# Patient Record
Sex: MALE | Race: OTHER | Employment: FULL TIME | URBAN - METROPOLITAN AREA
[De-identification: names, ages, dates, MRNs, and addresses within clinical notes are randomized per-mention and may not be internally consistent; named-entity substitution may affect disease eponyms.]

---

## 2024-01-28 ENCOUNTER — HOSPITAL ENCOUNTER (EMERGENCY)
Facility: HOSPITAL | Age: 39
Discharge: HOME | End: 2024-01-28
Attending: EMERGENCY MEDICINE
Payer: COMMERCIAL

## 2024-01-28 ENCOUNTER — APPOINTMENT (EMERGENCY)
Dept: CT IMAGING | Facility: HOSPITAL | Age: 39
End: 2024-01-28
Payer: COMMERCIAL

## 2024-01-28 VITALS
DIASTOLIC BLOOD PRESSURE: 84 MMHG | WEIGHT: 168 LBS | RESPIRATION RATE: 20 BRPM | OXYGEN SATURATION: 100 % | TEMPERATURE: 97.9 F | HEART RATE: 74 BPM | SYSTOLIC BLOOD PRESSURE: 122 MMHG

## 2024-01-28 DIAGNOSIS — N39.0 UTI (URINARY TRACT INFECTION): ICD-10-CM

## 2024-01-28 DIAGNOSIS — K55.20 ANGIODYSPLASIA OF COLON: Primary | ICD-10-CM

## 2024-01-28 LAB
ALBUMIN SERPL BCP-MCNC: 4.5 G/DL (ref 3.5–5)
ALP SERPL-CCNC: 59 U/L (ref 34–104)
ALT SERPL W P-5'-P-CCNC: 15 U/L (ref 7–52)
ANION GAP SERPL CALCULATED.3IONS-SCNC: 6 MMOL/L
AST SERPL W P-5'-P-CCNC: 18 U/L (ref 13–39)
BACTERIA UR QL AUTO: ABNORMAL /HPF
BASOPHILS # BLD AUTO: 0.03 THOUSANDS/ÂΜL (ref 0–0.1)
BASOPHILS NFR BLD AUTO: 0 % (ref 0–1)
BILIRUB SERPL-MCNC: 1.37 MG/DL (ref 0.2–1)
BILIRUB UR QL STRIP: NEGATIVE
BUN SERPL-MCNC: 12 MG/DL (ref 5–25)
CALCIUM SERPL-MCNC: 9.7 MG/DL (ref 8.4–10.2)
CHLORIDE SERPL-SCNC: 107 MMOL/L (ref 96–108)
CLARITY UR: ABNORMAL
CO2 SERPL-SCNC: 28 MMOL/L (ref 21–32)
COLOR UR: ABNORMAL
CREAT SERPL-MCNC: 1.06 MG/DL (ref 0.6–1.3)
EOSINOPHIL # BLD AUTO: 0.1 THOUSAND/ÂΜL (ref 0–0.61)
EOSINOPHIL NFR BLD AUTO: 1 % (ref 0–6)
ERYTHROCYTE [DISTWIDTH] IN BLOOD BY AUTOMATED COUNT: 12.7 % (ref 11.6–15.1)
GFR SERPL CREATININE-BSD FRML MDRD: 87 ML/MIN/1.73SQ M
GLUCOSE SERPL-MCNC: 94 MG/DL (ref 65–140)
GLUCOSE UR STRIP-MCNC: NEGATIVE MG/DL
HCT VFR BLD AUTO: 45 % (ref 36.5–49.3)
HGB BLD-MCNC: 14.7 G/DL (ref 12–17)
HGB UR QL STRIP.AUTO: NEGATIVE
IMM GRANULOCYTES # BLD AUTO: 0.01 THOUSAND/UL (ref 0–0.2)
IMM GRANULOCYTES NFR BLD AUTO: 0 % (ref 0–2)
KETONES UR STRIP-MCNC: NEGATIVE MG/DL
LEUKOCYTE ESTERASE UR QL STRIP: ABNORMAL
LIPASE SERPL-CCNC: 9 U/L (ref 11–82)
LYMPHOCYTES # BLD AUTO: 1.75 THOUSANDS/ÂΜL (ref 0.6–4.47)
LYMPHOCYTES NFR BLD AUTO: 25 % (ref 14–44)
MCH RBC QN AUTO: 29.1 PG (ref 26.8–34.3)
MCHC RBC AUTO-ENTMCNC: 32.7 G/DL (ref 31.4–37.4)
MCV RBC AUTO: 89 FL (ref 82–98)
MONOCYTES # BLD AUTO: 0.62 THOUSAND/ÂΜL (ref 0.17–1.22)
MONOCYTES NFR BLD AUTO: 9 % (ref 4–12)
MUCOUS THREADS UR QL AUTO: ABNORMAL
NEUTROPHILS # BLD AUTO: 4.53 THOUSANDS/ÂΜL (ref 1.85–7.62)
NEUTS SEG NFR BLD AUTO: 65 % (ref 43–75)
NITRITE UR QL STRIP: NEGATIVE
NON-SQ EPI CELLS URNS QL MICRO: ABNORMAL /HPF
NRBC BLD AUTO-RTO: 0 /100 WBCS
PH UR STRIP.AUTO: 5.5 [PH]
PLATELET # BLD AUTO: 257 THOUSANDS/UL (ref 149–390)
PMV BLD AUTO: 10.3 FL (ref 8.9–12.7)
POTASSIUM SERPL-SCNC: 3.8 MMOL/L (ref 3.5–5.3)
PROT SERPL-MCNC: 7.6 G/DL (ref 6.4–8.4)
PROT UR STRIP-MCNC: ABNORMAL MG/DL
RBC # BLD AUTO: 5.05 MILLION/UL (ref 3.88–5.62)
RBC #/AREA URNS AUTO: ABNORMAL /HPF
SODIUM SERPL-SCNC: 141 MMOL/L (ref 135–147)
SP GR UR STRIP.AUTO: 1.01 (ref 1–1.03)
UROBILINOGEN UR STRIP-ACNC: <2 MG/DL
WBC # BLD AUTO: 7.04 THOUSAND/UL (ref 4.31–10.16)
WBC #/AREA URNS AUTO: ABNORMAL /HPF

## 2024-01-28 PROCEDURE — 99284 EMERGENCY DEPT VISIT MOD MDM: CPT

## 2024-01-28 PROCEDURE — 87077 CULTURE AEROBIC IDENTIFY: CPT | Performed by: EMERGENCY MEDICINE

## 2024-01-28 PROCEDURE — G1004 CDSM NDSC: HCPCS

## 2024-01-28 PROCEDURE — 83690 ASSAY OF LIPASE: CPT | Performed by: EMERGENCY MEDICINE

## 2024-01-28 PROCEDURE — 96365 THER/PROPH/DIAG IV INF INIT: CPT

## 2024-01-28 PROCEDURE — 99285 EMERGENCY DEPT VISIT HI MDM: CPT | Performed by: EMERGENCY MEDICINE

## 2024-01-28 PROCEDURE — 36415 COLL VENOUS BLD VENIPUNCTURE: CPT

## 2024-01-28 PROCEDURE — 87086 URINE CULTURE/COLONY COUNT: CPT | Performed by: EMERGENCY MEDICINE

## 2024-01-28 PROCEDURE — 85025 COMPLETE CBC W/AUTO DIFF WBC: CPT | Performed by: EMERGENCY MEDICINE

## 2024-01-28 PROCEDURE — 81001 URINALYSIS AUTO W/SCOPE: CPT | Performed by: EMERGENCY MEDICINE

## 2024-01-28 PROCEDURE — 87186 SC STD MICRODIL/AGAR DIL: CPT | Performed by: EMERGENCY MEDICINE

## 2024-01-28 PROCEDURE — 80053 COMPREHEN METABOLIC PANEL: CPT | Performed by: EMERGENCY MEDICINE

## 2024-01-28 PROCEDURE — 74177 CT ABD & PELVIS W/CONTRAST: CPT

## 2024-01-28 RX ORDER — CEFPODOXIME PROXETIL 200 MG/1
200 TABLET, FILM COATED ORAL 2 TIMES DAILY
Qty: 20 TABLET | Refills: 0 | Status: SHIPPED | OUTPATIENT
Start: 2024-01-28 | End: 2024-02-07

## 2024-01-28 RX ADMIN — CEFTRIAXONE SODIUM 1000 MG: 10 INJECTION, POWDER, FOR SOLUTION INTRAVENOUS at 14:17

## 2024-01-28 RX ADMIN — IOHEXOL 85 ML: 350 INJECTION, SOLUTION INTRAVENOUS at 15:15

## 2024-01-29 NOTE — ED PROVIDER NOTES
History  Chief Complaint   Patient presents with    Abdominal Pain     Patient here for eval of worsening abd pain in the last 2 weeks. Hx. Ecoli that provider reports has spread to his bladder. +Cloudy urine +Malodorous urine . Denies Fevers/hematuria. Last BM yesterday, normal. Denies N/V/D.        Abdominal Pain  Associated symptoms: dysuria    Associated symptoms: no chest pain, no chills, no cough, no diarrhea, no fever, no nausea, no shortness of breath, no sore throat and no vomiting      38-year-old presented to the ER with dysuria.  History of E. coli UTI in the past.  Has some suprapubic pain.  No fevers or chills.  No nausea vomiting.  No diarrhea.  No back pain.  No IV drug use.  No testicular pain.        None       History reviewed. No pertinent past medical history.    History reviewed. No pertinent surgical history.    History reviewed. No pertinent family history.  I have reviewed and agree with the history as documented.    E-Cigarette/Vaping     E-Cigarette/Vaping Substances     Social History     Tobacco Use    Smoking status: Never    Smokeless tobacco: Never       Review of Systems   Constitutional:  Negative for chills, diaphoresis and fever.   HENT:  Negative for congestion and sore throat.    Respiratory:  Negative for cough, shortness of breath, wheezing and stridor.    Cardiovascular:  Negative for chest pain, palpitations and leg swelling.   Gastrointestinal:  Positive for abdominal pain. Negative for blood in stool, diarrhea, nausea and vomiting.   Genitourinary:  Positive for dysuria. Negative for frequency and urgency.   Musculoskeletal:  Negative for neck pain and neck stiffness.   Skin:  Negative for pallor and rash.   Neurological:  Negative for dizziness, syncope, weakness, light-headedness and headaches.   All other systems reviewed and are negative.      Physical Exam  Physical Exam  Vitals reviewed.   Constitutional:       Appearance: He is well-developed.   HENT:      Head:  Normocephalic and atraumatic.   Eyes:      Pupils: Pupils are equal, round, and reactive to light.   Cardiovascular:      Rate and Rhythm: Normal rate and regular rhythm.      Heart sounds: Normal heart sounds.   Pulmonary:      Effort: Pulmonary effort is normal. No respiratory distress.      Breath sounds: Normal breath sounds.   Abdominal:      General: Bowel sounds are normal.      Palpations: Abdomen is soft.      Tenderness: There is abdominal tenderness in the suprapubic area.   Musculoskeletal:      Cervical back: Neck supple.   Skin:     General: Skin is warm and dry.      Capillary Refill: Capillary refill takes less than 2 seconds.   Neurological:      General: No focal deficit present.      Mental Status: He is alert and oriented to person, place, and time.         Vital Signs  ED Triage Vitals [01/28/24 1245]   Temperature Pulse Respirations Blood Pressure SpO2   97.9 °F (36.6 °C) 89 20 122/80 98 %      Temp Source Heart Rate Source Patient Position - Orthostatic VS BP Location FiO2 (%)   Oral Monitor Sitting Left arm --      Pain Score       --           Vitals:    01/28/24 1245 01/28/24 1645   BP: 122/80 122/84   Pulse: 89 74   Patient Position - Orthostatic VS: Sitting Lying         Visual Acuity      ED Medications  Medications   ceftriaxone (ROCEPHIN) 1 g/50 mL in dextrose IVPB (0 mg Intravenous Stopped 1/28/24 1447)   iohexol (OMNIPAQUE) 350 MG/ML injection (MULTI-DOSE) 85 mL (85 mL Intravenous Given 1/28/24 1515)       Diagnostic Studies  Results Reviewed       Procedure Component Value Units Date/Time    Urine Microscopic [[id number]]  (Abnormal) Collected: 01/28/24 1316    Lab Status: Final result Specimen: Urine, Clean Catch Updated: 01/28/24 1351     RBC, UA 4-10 /hpf      WBC, UA Innumerable /hpf      Epithelial Cells None Seen /hpf      Bacteria, UA Innumerable /hpf      MUCUS THREADS Occasional    Urine culture [[id number]] Collected: 01/28/24 1316    Lab Status: In process Specimen: Urine,  Clean Catch Updated: 01/28/24 1351    UA w Reflex to Microscopic w Reflex to Culture [[id number]]  (Abnormal) Collected: 01/28/24 1316    Lab Status: Final result Specimen: Urine, Clean Catch Updated: 01/28/24 1346     Color, UA Light Yellow     Clarity, UA Turbid     Specific Gravity, UA 1.015     pH, UA 5.5     Leukocytes, UA Large     Nitrite, UA Negative     Protein, UA Trace mg/dl      Glucose, UA Negative mg/dl      Ketones, UA Negative mg/dl      Urobilinogen, UA <2.0 mg/dl      Bilirubin, UA Negative     Occult Blood, UA Negative    Comprehensive metabolic panel [[id number]]  (Abnormal) Collected: 01/28/24 1253    Lab Status: Final result Specimen: Blood from Arm, Right Updated: 01/28/24 1316     Sodium 141 mmol/L      Potassium 3.8 mmol/L      Chloride 107 mmol/L      CO2 28 mmol/L      ANION GAP 6 mmol/L      BUN 12 mg/dL      Creatinine 1.06 mg/dL      Glucose 94 mg/dL      Calcium 9.7 mg/dL      AST 18 U/L      ALT 15 U/L      Alkaline Phosphatase 59 U/L      Total Protein 7.6 g/dL      Albumin 4.5 g/dL      Total Bilirubin 1.37 mg/dL      eGFR 87 ml/min/1.73sq m     Narrative:      National Kidney Disease Foundation guidelines for Chronic Kidney Disease (CKD):     Stage 1 with normal or high GFR (GFR > 90 mL/min/1.73 square meters)    Stage 2 Mild CKD (GFR = 60-89 mL/min/1.73 square meters)    Stage 3A Moderate CKD (GFR = 45-59 mL/min/1.73 square meters)    Stage 3B Moderate CKD (GFR = 30-44 mL/min/1.73 square meters)    Stage 4 Severe CKD (GFR = 15-29 mL/min/1.73 square meters)    Stage 5 End Stage CKD (GFR <15 mL/min/1.73 square meters)  Note: GFR calculation is accurate only with a steady state creatinine    Lipase [[id number]]  (Abnormal) Collected: 01/28/24 1253    Lab Status: Final result Specimen: Blood from Arm, Right Updated: 01/28/24 1316     Lipase 9 u/L     CBC and differential [[id number]] Collected: 01/28/24 1253    Lab Status: Final result Specimen: Blood from Arm, Right Updated: 01/28/24  1259     WBC 7.04 Thousand/uL      RBC 5.05 Million/uL      Hemoglobin 14.7 g/dL      Hematocrit 45.0 %      MCV 89 fL      MCH 29.1 pg      MCHC 32.7 g/dL      RDW 12.7 %      MPV 10.3 fL      Platelets 257 Thousands/uL      nRBC 0 /100 WBCs      Neutrophils Relative 65 %      Immat GRANS % 0 %      Lymphocytes Relative 25 %      Monocytes Relative 9 %      Eosinophils Relative 1 %      Basophils Relative 0 %      Neutrophils Absolute 4.53 Thousands/µL      Immature Grans Absolute 0.01 Thousand/uL      Lymphocytes Absolute 1.75 Thousands/µL      Monocytes Absolute 0.62 Thousand/µL      Eosinophils Absolute 0.10 Thousand/µL      Basophils Absolute 0.03 Thousands/µL                    CT abdomen pelvis with contrast   Final Result by Cy Negron MD (01/28 1708)      1.  Bladder wall appears diffusely thickened though is under distended. Correlate for evidence of cystitis.   2.  Findings suggest angiodysplasia in the right colon.         Workstation performed: [id number]                    Procedures  Procedures         ED Course                               SBIRT 20yo+      Flowsheet Row Most Recent Value   Initial Alcohol Screen: US AUDIT-C     1. How often do you have a drink containing alcohol? 0 Filed at: 01/28/2024 1404   2. How many drinks containing alcohol do you have on a typical day you are drinking?  0 Filed at: 01/28/2024 1404   3a. Male UNDER 65: How often do you have five or more drinks on one occasion? 0 Filed at: 01/28/2024 1404   3b. FEMALE Any Age, or MALE 65+: How often do you have 4 or more drinks on one occassion? 0 Filed at: 01/28/2024 1404   Audit-C Score 0 Filed at: 01/28/2024 1404   LYNDSAY: How many times in the past year have you...    Used an illegal drug or used a prescription medication for non-medical reasons? Never Filed at: 01/28/2024 1404                      Medical Decision Making  38-year-old with recurrent UTI.  Will check urine, kidney function, CBC, CT to evaluate for  anatomical abnormality.    Previous culture shows sensitivity to third-generation cephalosporins.  Will discharge with antibiotics, stressed importance of following up with urology and also gastroenterology due to incidental finding on CAT scan    Amount and/or Complexity of Data Reviewed  Labs: ordered.  Radiology: ordered.    Risk  Prescription drug management.             Disposition  Final diagnoses:   Angiodysplasia of colon   UTI (urinary tract infection)     Time reflects when diagnosis was documented in both MDM as applicable and the Disposition within this note       Time User Action Codes Description Comment    1/28/2024  5:13 PM Anusha Sandy Add [K55.20] Angiodysplasia of colon     1/28/2024  5:13 PM Anusha Sandy Add [N39.0] UTI (urinary tract infection)           ED Disposition       ED Disposition   Discharge    Condition   Stable    Date/Time   Sun Jan 28, 2024 1713    Comment   Oscar Mac discharge to home/self care.                   Follow-up Information       Follow up With Specialties Details Why Contact Info Additional Information    [facility] Emergency Department Emergency Medicine  As needed, If symptoms worsen [address] [facility] [place] [facility] [address]  [phone] [facility] Emergency Department, [address], [place], [address]    [facility] [place] [facility] Schedule an appointment as soon as possible for a visit   [address]  [facility] [id number] [phone] [facility] [place], [address] [facility] [address], [place], [address] [phone]    [facility] [place] [facility] Schedule an appointment as soon as possible for a visit   [address] [facility] [address] [address]  [facility] [address] [phone] [facility] [place], [address] [facility] [address], [place], [address]    [phone]    [facility] Schedule an appointment as soon as possible for a visit  Please follow-up with family doctor [address] [place] [address] [phone] [facility], [address] [place] [address], [address] [phone]            Discharge Medication List as of 1/28/2024  5:15 PM        START taking these medications    Details   cefpodoxime (VANTIN) 200 mg tablet Take 1 tablet (200 mg total) by mouth 2 (two) times a day for 10 days, Starting Sun 1/28/2024, Until Wed 2/7/2024, Normal                 PDMP Review       None            ED Provider  Electronically Signed by             Anusha Sandy MD  01/28/24 0925

## 2024-01-30 LAB — BACTERIA UR CULT: ABNORMAL

## 2024-02-05 ENCOUNTER — OFFICE VISIT (OUTPATIENT)
Dept: GASTROENTEROLOGY | Facility: CLINIC | Age: 39
End: 2024-02-05
Payer: COMMERCIAL

## 2024-02-05 VITALS
HEIGHT: 64 IN | HEART RATE: 69 BPM | BODY MASS INDEX: 29.19 KG/M2 | DIASTOLIC BLOOD PRESSURE: 86 MMHG | WEIGHT: 171 LBS | SYSTOLIC BLOOD PRESSURE: 135 MMHG

## 2024-02-05 DIAGNOSIS — Z86.718 HISTORY OF VENOUS THROMBOSIS: ICD-10-CM

## 2024-02-05 DIAGNOSIS — K55.20 ANGIODYSPLASIA OF COLON: Primary | ICD-10-CM

## 2024-02-05 PROBLEM — K55.069 MESENTERIC VEIN THROMBOSIS (HCC): Status: ACTIVE | Noted: 2020-08-23

## 2024-02-05 PROBLEM — I81 PORTAL VEIN THROMBOSIS: Status: ACTIVE | Noted: 2020-08-23

## 2024-02-05 PROCEDURE — 99203 OFFICE O/P NEW LOW 30 MIN: CPT | Performed by: INTERNAL MEDICINE

## 2024-02-05 NOTE — PROGRESS NOTES
[facility] - Outpatient Consultation  Oscar Mac 38 y.o. male MRN: [id number]  Encounter: [id number]        ASSESSMENT AND PLAN:       Diagnoses and all orders for this visit:    Angiodysplasia of colon    History of venous thrombosis    Colonic angiodysplasia likely sequelae of prior portal vein thrombosis.  Previously treated with 6 months of Eliquis with no clear underlying etiology identified.  No hypercoagulable state.  No evidence of active clot at this time.  No evidence of gastrointestinal bleeding.  We discussed this diagnosis.  Will plan routine screening colonoscopy at age 43 unless he develops symptoms, especially suggestion of gastrointestinal bleeding  ______________________________________________________________________    HPI: Patient presents following a visit to the emergency room where he was diagnosed with a urinary tract infection.  At that time he underwent CT scan of the abdomen and pelvis which revealed evidence of colonic angiodysplasia.  He has a history of acute portal vein thrombosis in 2020 with extension into the SMV and IMV.  He was treated with 6 months of Eliquis and underwent hematology evaluation with no clear etiology determined.  It was presumed that this was likely secondary to COVID infection.  He has since undergone MR angiography in 2021 with resolution of clot.  Current CT with no evidence of liver or pancreatic disease, no evidence of thrombosis.  Labs reviewed with no anemia.  Patient denies change in bowel habit or blood in stool.  No family history of gastrointestinal disease, vascular disease or hematologic disease      REVIEW OF SYSTEMS:    ROS     Historical Information   History reviewed. No pertinent past medical history.  History reviewed. No pertinent surgical history.  Social History   Social History     Substance and Sexual Activity   Alcohol Use None     Social History     Substance and Sexual Activity   Drug Use Not on file  "    Social History     Tobacco Use   Smoking Status Never   Smokeless Tobacco Never     History reviewed. No pertinent family history.    Meds/Allergies       Current Outpatient Medications:     cefpodoxime (VANTIN) 200 mg tablet    No Known Allergies        Objective     Blood pressure 135/86, pulse 69, height 5' 4\" (1.626 m), weight 77.6 kg (171 lb). Body mass index is 29.35 kg/m².        PHYSICAL EXAM:      Physical Exam  Vitals and nursing note reviewed.   Constitutional:       General: He is not in acute distress.  HENT:      Head: Normocephalic and atraumatic.      Mouth/Throat:      Mouth: Mucous membranes are moist.   Eyes:      General: No scleral icterus.     Pupils: Pupils are equal, round, and reactive to light.   Cardiovascular:      Rate and Rhythm: Normal rate and regular rhythm.   Pulmonary:      Effort: Pulmonary effort is normal. No respiratory distress.   Abdominal:      General: There is no distension.      Palpations: Abdomen is soft.      Tenderness: There is no abdominal tenderness. There is no guarding or rebound.   Musculoskeletal:         General: Normal range of motion.      Cervical back: Normal range of motion and neck supple.      Right lower leg: No edema.      Left lower leg: No edema.   Skin:     General: Skin is warm and dry.      Findings: No bruising.   Neurological:      General: No focal deficit present.      Mental Status: He is alert and oriented to person, place, and time.   Psychiatric:         Mood and Affect: Mood normal.         Behavior: Behavior normal.              Lab Results:   No visits with results within 1 Day(s) from this visit.   Latest known visit with results is:   Admission on 01/28/2024, Discharged on 01/28/2024   Component Date Value    WBC 01/28/2024 7.04     RBC 01/28/2024 5.05     Hemoglobin 01/28/2024 14.7     Hematocrit 01/28/2024 45.0     MCV 01/28/2024 89     MCH 01/28/2024 29.1     MCHC 01/28/2024 32.7     RDW 01/28/2024 12.7     MPV 01/28/2024 10.3 "     Platelets 01/28/2024 257     nRBC 01/28/2024 0     Neutrophils Relative 01/28/2024 65     Immat GRANS % 01/28/2024 0     Lymphocytes Relative 01/28/2024 25     Monocytes Relative 01/28/2024 9     Eosinophils Relative 01/28/2024 1     Basophils Relative 01/28/2024 0     Neutrophils Absolute 01/28/2024 4.53     Immature Grans Absolute 01/28/2024 0.01     Lymphocytes Absolute 01/28/2024 1.75     Monocytes Absolute 01/28/2024 0.62     Eosinophils Absolute 01/28/2024 0.10     Basophils Absolute 01/28/2024 0.03     Sodium 01/28/2024 141     Potassium 01/28/2024 3.8     Chloride 01/28/2024 107     CO2 01/28/2024 28     ANION GAP 01/28/2024 6     BUN 01/28/2024 12     Creatinine 01/28/2024 1.06     Glucose 01/28/2024 94     Calcium 01/28/2024 9.7     AST 01/28/2024 18     ALT 01/28/2024 15     Alkaline Phosphatase 01/28/2024 59     Total Protein 01/28/2024 7.6     Albumin 01/28/2024 4.5     Total Bilirubin 01/28/2024 1.37 (H)     eGFR 01/28/2024 87     Lipase 01/28/2024 9 (L)     Color, UA 01/28/2024 Light Yellow     Clarity, UA 01/28/2024 Turbid     Specific Gravity, UA 01/28/2024 1.015     pH, UA 01/28/2024 5.5     Leukocytes, UA 01/28/2024 Large (A)     Nitrite, UA 01/28/2024 Negative     Protein, UA 01/28/2024 Trace (A)     Glucose, UA 01/28/2024 Negative     Ketones, UA 01/28/2024 Negative     Urobilinogen, UA 01/28/2024 <2.0     Bilirubin, UA 01/28/2024 Negative     Occult Blood, UA 01/28/2024 Negative     RBC, UA 01/28/2024 4-10 (A)     WBC, UA 01/28/2024 Innumerable (A)     Epithelial Cells 01/28/2024 None Seen     Bacteria, UA 01/28/2024 Innumerable (A)     MUCUS THREADS 01/28/2024 Occasional (A)     Urine Culture 01/28/2024 >100,000 cfu/ml Escherichia coli (A)          Radiology Results:   CT abdomen pelvis with contrast    Result Date: 1/28/2024  Narrative: CT ABDOMEN AND PELVIS WITH IV CONTRAST INDICATION: Abdominal pain, acute, nonlocalized lower abd pain, frequent utis. COMPARISON: None. TECHNIQUE: CT  examination of the abdomen and pelvis was performed. Multiplanar 2D reformatted images were created from the source data. This examination, like all CT scans performed in the [facility], was performed utilizing techniques to minimize radiation dose exposure, including the use of iterative reconstruction and automated exposure control. Radiation dose length product (DLP) for this visit: 801 mGy-cm IV Contrast: 85 mL of iohexol (OMNIPAQUE) Enteric Contrast: Not administered. FINDINGS: ABDOMEN LOWER CHEST: Small hiatal hernia. No other clinically significant abnormality in the visualized lower chest. LIVER/BILIARY TREE: Unremarkable. GALLBLADDER: No calcified gallstones. No pericholecystic inflammatory change. SPLEEN: Unremarkable. PANCREAS: Unremarkable. ADRENAL GLANDS: Unremarkable. KIDNEYS/URETERS: No hydronephrosis or urinary tract calculi. Subcentimeter hypoattenuating renal lesion(s), too small to characterize but statistically likely benign, which do not warrant follow-up (Radiology June 2019). STOMACH AND BOWEL: Surgical changes to the stomach suggesting prior sleeve gastrectomy. No dilated loops of bowel. Prominent intramural vessels in the proximal right colon such as seen on series 301 image 96 with surrounding tortuous pericolonic veins suggesting angiodysplasia. APPENDIX: No findings to suggest appendicitis. ABDOMINOPELVIC CAVITY: No ascites. No pneumoperitoneum. No lymphadenopathy. VESSELS: In addition to the right pericolonic vessels described above, there is prominent collateral veins throughout the mesentery. This may be the sequela of previous superior mesenteric vein thrombosis described on outside exam from 2020. SMV is now patent though appears small in caliber. Patent portal vein. No abdominal aortic aneurysm. PELVIS REPRODUCTIVE ORGANS: Unremarkable for patient's age. URINARY BLADDER: Nondistended though appears diffusely thickened. ABDOMINAL WALL/INGUINAL REGIONS: Unremarkable.  BONES: No acute fracture or suspicious osseous lesion.     Impression: 1.  Bladder wall appears diffusely thickened though is under distended. Correlate for evidence of cystitis. 2.  Findings suggest angiodysplasia in the right colon. Workstation performed: [id number]

## 2024-02-23 RX ORDER — NITROFURANTOIN 25; 75 MG/1; MG/1
CAPSULE ORAL
COMMUNITY
Start: 2024-01-03

## 2024-02-27 ENCOUNTER — CONSULT (OUTPATIENT)
Dept: UROLOGY | Facility: CLINIC | Age: 39
End: 2024-02-27
Payer: COMMERCIAL

## 2024-02-27 VITALS
OXYGEN SATURATION: 98 % | SYSTOLIC BLOOD PRESSURE: 124 MMHG | BODY MASS INDEX: 29.37 KG/M2 | DIASTOLIC BLOOD PRESSURE: 82 MMHG | HEIGHT: 64 IN | WEIGHT: 172 LBS | HEART RATE: 76 BPM | RESPIRATION RATE: 14 BRPM

## 2024-02-27 DIAGNOSIS — N39.0 UTI (URINARY TRACT INFECTION): Primary | ICD-10-CM

## 2024-02-27 LAB
SL AMB  POCT GLUCOSE, UA: NORMAL
SL AMB LEUKOCYTE ESTERASE,UA: NORMAL
SL AMB POCT BILIRUBIN,UA: NORMAL
SL AMB POCT BLOOD,UA: NORMAL
SL AMB POCT CLARITY,UA: CLEAR
SL AMB POCT COLOR,UA: YELLOW
SL AMB POCT KETONES,UA: NORMAL
SL AMB POCT NITRITE,UA: NORMAL
SL AMB POCT PH,UA: 5
SL AMB POCT SPECIFIC GRAVITY,UA: 1.02
SL AMB POCT URINE PROTEIN: NORMAL
SL AMB POCT UROBILINOGEN: NORMAL

## 2024-02-27 PROCEDURE — 99203 OFFICE O/P NEW LOW 30 MIN: CPT | Performed by: UROLOGY

## 2024-02-27 PROCEDURE — 81002 URINALYSIS NONAUTO W/O SCOPE: CPT | Performed by: UROLOGY

## 2024-02-27 NOTE — PROGRESS NOTES
Oscar Mac is a(n) 38 y.o. male. , :  1985    Subjective   Chief Complaint:   Chief Complaint   Patient presents with    Urinary Tract Infection     Diagnoses: The encounter diagnosis was UTI (urinary tract infection).    Assessment/Plan  38-year-old gentleman referred for urinary tract infection.  CT imaging shows some bladder wall thickening and correlation for cystitis.  Patient had E. coli urinary infection 2024.  One unprotected intercourse in September and got UTI after that.  Denies spraying/splitting.  No issues before September. No fevers or chills.  Plan: trial of d mannose. Maybe a few months. Then stop and try without it. May need cystoscopy.    Patient Instructions   We recommend taking 1 gram of D-Mannose powder in the morning, and another in the evening for a standard maintenance dose.    You can purchase a 250 g bag from "[employer] on [employer] for $23 (look for a white foil bag with an orange person on the front). This bag that should last you a few months.  The dosing is half of a scoop twice daily in about 4 ounces of liquid. We advise waiting about a half hour for the mannose to get excreted into your urinary tract to coat any bacteria there. After the half hour, drink 16 ounces over a few minutes to flush out the coated bacteria.    Sometimes if you get symptoms of an infection and need urgent relief, we recommend taking 2 grams of D-Mannose every 3 - 4 hours, for up to 48 hours.  Failure to respond to that dose most likely means that it is a resistant organism.  There are some infections that D-mannose does not work for, so a urine culture is necessary as is a prescription antibiotic.  So if symptoms persist after 48 hours or there are accompanying fevers or chills, please contact your health care provider.     Radiology  2024 CT abdomen pelvis with contrast  1.  Bladder wall appears diffusely thickened though is under distended. Correlate for evidence of cystitis.  2.   "Findings suggest angiodysplasia in the right colon.     History  Urinary tract infection with E. coli numerous occasions.  Most recent January 24.  Treated with Rocephin.  Advising trial of d mannose since was UTI of e coli.    Prior Visits  1/28/2024 [facility] emergency room  38-year-old presented to the ER with dysuria. History of E. coli UTI in the past. Has some suprapubic pain. No fevers or chills. No nausea vomiting. No diarrhea. No back pain. No IV drug use. No testicular pain.     2/27/2024 [facility]  38-year-old gentleman referred for urinary tract infection.  CT imaging shows some bladder wall thickening and correlation for cystitis.  Patient had E. coli urinary infection 1/28/2024.  One unprotected intercourse in September and got UTI after that.  Denies spraying/splitting.  No issues before September. No fevers or chills.  Plan: trial of d mannose. Maybe a few months. Then stop and try without it. May need cystoscopy.    No results found for: \"PSA\"  No components found for: \"CR\"    No Known Allergies    Review of Systems    History reviewed. No pertinent surgical history.    History reviewed. No pertinent family history.    Social History     Socioeconomic History    Marital status: Single     Spouse name: Not on file    Number of children: Not on file    Years of education: Not on file    Highest education level: Not on file   Occupational History    Not on file   Tobacco Use    Smoking status: Never    Smokeless tobacco: Never   Substance and Sexual Activity    Alcohol use: Not on file    Drug use: Not on file    Sexual activity: Not on file   Other Topics Concern    Not on file   Social History Narrative    Not on file     Social Determinants of Health     Financial Resource Strain: Not on file   Food Insecurity: Not on file   Transportation Needs: Not on file   Physical Activity: Not on file   Stress: Not on file   Social Connections: Not on file   Intimate Partner Violence: Not on file   Housing " "Stability: Not on file         Current Outpatient Medications:     nitrofurantoin (MACROBID) 100 mg capsule, TAKE 1 CAPSULE BY MOUTH EVERY 12 HOURS WITH FOOD FOR 7 DAYS (Patient not taking: Reported on 2/27/2024), Disp: , Rfl:     Objective     /82 (BP Location: Left arm, Patient Position: Sitting, Cuff Size: Standard)   Pulse 76   Resp 14   Ht 5' 4\" (1.626 m)   Wt 78 kg (172 lb)   SpO2 98%   BMI 29.52 kg/m²     Physical Exam      [place] Laura, [facility]  "

## 2024-02-27 NOTE — PATIENT INSTRUCTIONS
We recommend taking 1 gram of D-Mannose powder in the morning, and another in the evening for a standard maintenance dose.    You can purchase a 250 g bag from [employer] on [employer] for $23 (look for a white foil bag with an orange person on the front). This bag that should last you a few months.  The dosing is half of a scoop twice daily in about 4 ounces of liquid. We advise waiting about a half hour for the mannose to get excreted into your urinary tract to coat any bacteria there. After the half hour, drink 16 ounces over a few minutes to flush out the coated bacteria.    Sometimes if you get symptoms of an infection and need urgent relief, we recommend taking 2 grams of D-Mannose every 3 - 4 hours, for up to 48 hours.  Failure to respond to that dose most likely means that it is a resistant organism.  There are some infections that D-mannose does not work for, so a urine culture is necessary as is a prescription antibiotic.  So if symptoms persist after 48 hours or there are accompanying fevers or chills, please contact your health care provider.